# Patient Record
Sex: FEMALE | Race: WHITE | NOT HISPANIC OR LATINO | URBAN - METROPOLITAN AREA
[De-identification: names, ages, dates, MRNs, and addresses within clinical notes are randomized per-mention and may not be internally consistent; named-entity substitution may affect disease eponyms.]

---

## 2024-03-08 ENCOUNTER — EMERGENCY (EMERGENCY)
Facility: HOSPITAL | Age: 34
LOS: 1 days | Discharge: HOME | End: 2024-03-08
Attending: STUDENT IN AN ORGANIZED HEALTH CARE EDUCATION/TRAINING PROGRAM
Payer: COMMERCIAL

## 2024-03-08 VITALS
DIASTOLIC BLOOD PRESSURE: 81 MMHG | HEART RATE: 68 BPM | OXYGEN SATURATION: 99 % | HEIGHT: 67 IN | TEMPERATURE: 98 F | WEIGHT: 199.96 LBS | RESPIRATION RATE: 18 BRPM | SYSTOLIC BLOOD PRESSURE: 131 MMHG

## 2024-03-08 LAB
ALBUMIN SERPL ELPH-MCNC: 3.6 G/DL — SIGNIFICANT CHANGE UP (ref 3.5–5)
ALP SERPL-CCNC: 54 U/L — SIGNIFICANT CHANGE UP (ref 40–120)
ALT FLD-CCNC: 21 U/L DA — SIGNIFICANT CHANGE UP (ref 10–60)
ANION GAP SERPL CALC-SCNC: 8 MMOL/L — SIGNIFICANT CHANGE UP (ref 5–17)
APAP SERPL-MCNC: <2 UG/ML — LOW (ref 10–30)
AST SERPL-CCNC: 18 U/L — SIGNIFICANT CHANGE UP (ref 10–40)
BASE EXCESS BLDV CALC-SCNC: 3.5 MMOL/L — SIGNIFICANT CHANGE UP
BASOPHILS # BLD AUTO: 0 K/UL — SIGNIFICANT CHANGE UP (ref 0–0.2)
BASOPHILS NFR BLD AUTO: 0 % — SIGNIFICANT CHANGE UP (ref 0–2)
BILIRUB SERPL-MCNC: 0.2 MG/DL — SIGNIFICANT CHANGE UP (ref 0.2–1.2)
BLOOD GAS COMMENTS, VENOUS: SIGNIFICANT CHANGE UP
BUN SERPL-MCNC: 18 MG/DL — SIGNIFICANT CHANGE UP (ref 7–18)
CA-I SERPL-SCNC: SIGNIFICANT CHANGE UP MMOL/L (ref 1.15–1.33)
CALCIUM SERPL-MCNC: 9.6 MG/DL — SIGNIFICANT CHANGE UP (ref 8.4–10.5)
CHLORIDE SERPL-SCNC: 108 MMOL/L — SIGNIFICANT CHANGE UP (ref 96–108)
CK SERPL-CCNC: 65 U/L — SIGNIFICANT CHANGE UP (ref 21–215)
CO2 SERPL-SCNC: 24 MMOL/L — SIGNIFICANT CHANGE UP (ref 22–31)
CREAT SERPL-MCNC: 0.78 MG/DL — SIGNIFICANT CHANGE UP (ref 0.5–1.3)
EGFR: 103 ML/MIN/1.73M2 — SIGNIFICANT CHANGE UP
EOSINOPHIL # BLD AUTO: 0.61 K/UL — HIGH (ref 0–0.5)
EOSINOPHIL NFR BLD AUTO: 5 % — SIGNIFICANT CHANGE UP (ref 0–6)
ETHANOL SERPL-MCNC: <3 MG/DL — SIGNIFICANT CHANGE UP (ref 0–10)
GAS PNL BLDV: 137 MMOL/L — SIGNIFICANT CHANGE UP (ref 136–145)
GAS PNL BLDV: SIGNIFICANT CHANGE UP
GLUCOSE SERPL-MCNC: 94 MG/DL — SIGNIFICANT CHANGE UP (ref 70–99)
HCG SERPL-ACNC: <1 MIU/ML — SIGNIFICANT CHANGE UP
HCO3 BLDV-SCNC: 29 MMOL/L — SIGNIFICANT CHANGE UP (ref 22–29)
HCT VFR BLD CALC: 37.6 % — SIGNIFICANT CHANGE UP (ref 34.5–45)
HGB BLD-MCNC: 12.4 G/DL — SIGNIFICANT CHANGE UP (ref 11.5–15.5)
HOROWITZ INDEX BLDV+IHG-RTO: 21 — SIGNIFICANT CHANGE UP
LACTATE BLDV-MCNC: 0.9 MMOL/L — SIGNIFICANT CHANGE UP (ref 0.5–2)
LYMPHOCYTES # BLD AUTO: 48 % — HIGH (ref 13–44)
LYMPHOCYTES # BLD AUTO: 5.83 K/UL — HIGH (ref 1–3.3)
MAGNESIUM SERPL-MCNC: 2 MG/DL — SIGNIFICANT CHANGE UP (ref 1.6–2.6)
MANUAL SMEAR VERIFICATION: SIGNIFICANT CHANGE UP
MCHC RBC-ENTMCNC: 29.2 PG — SIGNIFICANT CHANGE UP (ref 27–34)
MCHC RBC-ENTMCNC: 33 GM/DL — SIGNIFICANT CHANGE UP (ref 32–36)
MCV RBC AUTO: 88.5 FL — SIGNIFICANT CHANGE UP (ref 80–100)
MONOCYTES # BLD AUTO: 1.09 K/UL — HIGH (ref 0–0.9)
MONOCYTES NFR BLD AUTO: 9 % — SIGNIFICANT CHANGE UP (ref 2–14)
NEUTROPHILS # BLD AUTO: 4.62 K/UL — SIGNIFICANT CHANGE UP (ref 1.8–7.4)
NEUTROPHILS NFR BLD AUTO: 38 % — LOW (ref 43–77)
NRBC # BLD: 0 /100 WBCS — SIGNIFICANT CHANGE UP (ref 0–0)
PCO2 BLDV: 47 MMHG — HIGH (ref 39–42)
PH BLDV: 7.4 — SIGNIFICANT CHANGE UP (ref 7.32–7.43)
PHOSPHATE SERPL-MCNC: 3.9 MG/DL — SIGNIFICANT CHANGE UP (ref 2.5–4.5)
PLAT MORPH BLD: NORMAL — SIGNIFICANT CHANGE UP
PLATELET # BLD AUTO: 211 K/UL — SIGNIFICANT CHANGE UP (ref 150–400)
PO2 BLDV: 25 MMHG — SIGNIFICANT CHANGE UP
POTASSIUM BLDV-SCNC: 4.8 MMOL/L — SIGNIFICANT CHANGE UP (ref 3.5–5.1)
POTASSIUM SERPL-MCNC: 4.3 MMOL/L — SIGNIFICANT CHANGE UP (ref 3.5–5.3)
POTASSIUM SERPL-SCNC: 4.3 MMOL/L — SIGNIFICANT CHANGE UP (ref 3.5–5.3)
PROT SERPL-MCNC: 6.9 G/DL — SIGNIFICANT CHANGE UP (ref 6–8.3)
RBC # BLD: 4.25 M/UL — SIGNIFICANT CHANGE UP (ref 3.8–5.2)
RBC # FLD: 12.7 % — SIGNIFICANT CHANGE UP (ref 10.3–14.5)
RBC BLD AUTO: NORMAL — SIGNIFICANT CHANGE UP
SAO2 % BLDV: 32.5 % — SIGNIFICANT CHANGE UP
SODIUM SERPL-SCNC: 140 MMOL/L — SIGNIFICANT CHANGE UP (ref 135–145)
TSH SERPL-MCNC: 2.47 UU/ML — SIGNIFICANT CHANGE UP (ref 0.34–4.82)
WBC # BLD: 12.15 K/UL — HIGH (ref 3.8–10.5)
WBC # FLD AUTO: 12.15 K/UL — HIGH (ref 3.8–10.5)

## 2024-03-08 PROCEDURE — 99285 EMERGENCY DEPT VISIT HI MDM: CPT | Mod: 25

## 2024-03-08 PROCEDURE — 80053 COMPREHEN METABOLIC PANEL: CPT

## 2024-03-08 PROCEDURE — 93010 ELECTROCARDIOGRAM REPORT: CPT

## 2024-03-08 PROCEDURE — 80307 DRUG TEST PRSMV CHEM ANLYZR: CPT

## 2024-03-08 PROCEDURE — 84100 ASSAY OF PHOSPHORUS: CPT

## 2024-03-08 PROCEDURE — 82803 BLOOD GASES ANY COMBINATION: CPT

## 2024-03-08 PROCEDURE — 84295 ASSAY OF SERUM SODIUM: CPT

## 2024-03-08 PROCEDURE — 85025 COMPLETE CBC W/AUTO DIFF WBC: CPT

## 2024-03-08 PROCEDURE — 82330 ASSAY OF CALCIUM: CPT

## 2024-03-08 PROCEDURE — 84443 ASSAY THYROID STIM HORMONE: CPT

## 2024-03-08 PROCEDURE — 83735 ASSAY OF MAGNESIUM: CPT

## 2024-03-08 PROCEDURE — 93005 ELECTROCARDIOGRAM TRACING: CPT

## 2024-03-08 PROCEDURE — 99285 EMERGENCY DEPT VISIT HI MDM: CPT

## 2024-03-08 PROCEDURE — 82550 ASSAY OF CK (CPK): CPT

## 2024-03-08 PROCEDURE — 84702 CHORIONIC GONADOTROPIN TEST: CPT

## 2024-03-08 PROCEDURE — 36415 COLL VENOUS BLD VENIPUNCTURE: CPT

## 2024-03-08 PROCEDURE — 83605 ASSAY OF LACTIC ACID: CPT

## 2024-03-08 PROCEDURE — 84132 ASSAY OF SERUM POTASSIUM: CPT

## 2024-03-08 PROCEDURE — 71045 X-RAY EXAM CHEST 1 VIEW: CPT

## 2024-03-08 RX ORDER — SODIUM CHLORIDE 9 MG/ML
1000 INJECTION, SOLUTION INTRAVENOUS ONCE
Refills: 0 | Status: COMPLETED | OUTPATIENT
Start: 2024-03-08 | End: 2024-03-08

## 2024-03-08 RX ORDER — DIAZEPAM 5 MG
2 TABLET ORAL ONCE
Refills: 0 | Status: DISCONTINUED | OUTPATIENT
Start: 2024-03-08 | End: 2024-03-08

## 2024-03-08 RX ADMIN — SODIUM CHLORIDE 1000 MILLILITER(S): 9 INJECTION, SOLUTION INTRAVENOUS at 22:47

## 2024-03-08 RX ADMIN — Medication 2 MILLIGRAM(S): at 22:47

## 2024-03-08 NOTE — ED ADULT TRIAGE NOTE - CHIEF COMPLAINT QUOTE
Pt stated she was having chest pain before she started taking unprescribed ritalin after taking ritalin the chest pain went away c/o numbness and tingling to both arms, denies chest pain at this time was sen at urgent care today for same c/o and referred to come to the ER.

## 2024-03-08 NOTE — ED PROVIDER NOTE - PATIENT PORTAL LINK FT
You can access the FollowMyHealth Patient Portal offered by [facility] by registering at the following website: [email or web address]. By joining [employer]’s FollowMyHealth portal, you will also be able to view your health information using other applications (apps) compatible with our system. Yes

## 2024-03-08 NOTE — ED PROVIDER NOTE - CLINICAL SUMMARY MEDICAL DECISION MAKING FREE TEXT BOX
33-year-old female with no significant past medical history presents to the emergency department with bilateral distal upper extremity paresthesias for the past 3 to 4 days as well as bilateral distal lower extremity paresthesias for the past 3 to 4 days, patient also reports some palpitations and an episode of chest pain that resolved 3 days ago.  Patient states that for the past almost a week she has been taking an unprescribed Ritalin to help her focus, she states that she looked up online that if you have these symptoms after taking Ritalin patient's seek medical attention.  Patient went to urgent care and had an EKG done that they were concerned about, unclear what they are concerned about this time looking at the EKG, and sent him to the emergency department.  Patient also reports taking the [employer] energy drink within the past 3 to 4 days.  Denies any illicit drugs, denies daily alcohol drinking.  Patient denies any fevers or chills, URI symptoms, cough, shortness of breath, lightheadedness, abdominal pain, vomiting or diarrhea, dark or bloody stools, urinary symptoms, abnormal uterine bleeding.      ALL SYSTEMS REVIEWED AND NEGATIVE EXCEPT AS ABOVE IN THE HISTORY     GENERAL: no acute distress, non-toxic appearing  HEENT: normal conjunctiva, oral mucosa moist  CARDIAC: regular rate and regular rhythm, normal S1 and S2, no appreciable murmurs, 2+ distal pulses, good cap refill to toes/fingers  PULM: clear to ascultation bilaterally, no appreciable crackles, rales, rhonchi, or wheezing, sats 100% on RA, no increased work of breathing  GI: abdomen nondistended, soft, nontender  : no CVA tenderness, no suprapubic tenderness  NEURO: alert and oriented x 3, normal speech, moving all extremities without lateralization  MSK: no visible deformities, no peripheral edema, calf tenderness/redness/swelling, no tense compartments  SKIN: no visible rashes  PSYCH: appropriate mood and affect     MDM: Most likely side effect of Ritalin versus caffeine/methylxanthine's versus hyperventilation syndrome.  Low suspicion for primary cardiac, or metabolic cause of patient's symptoms.  Plan for labs, TSH, EKG, lites, hCG, CK, diazepam, fluids, chest x-ray.  Reassess. 33-year-old female with no significant past medical history presents to the emergency department with bilateral distal upper extremity paresthesias for the past 3 to 4 days as well as bilateral distal lower extremity paresthesias for the past 3 to 4 days, patient also reports some palpitations and an episode of chest pain that resolved 3 days ago.  Patient states that for the past almost a week she has been taking an unprescribed Ritalin to help her focus, she states that she looked up online that if you have these symptoms after taking Ritalin patient's seek medical attention.  Patient went to urgent care and had an EKG done that they were concerned about, unclear what they are concerned about this time looking at the EKG, and sent him to the emergency department.  Patient also reports taking the [employer] energy drink within the past 3 to 4 days.  Denies any illicit drugs, denies daily alcohol drinking.  Patient denies any fevers or chills, URI symptoms, cough, shortness of breath, lightheadedness, abdominal pain, vomiting or diarrhea, dark or bloody stools, urinary symptoms, abnormal uterine bleeding.      ALL SYSTEMS REVIEWED AND NEGATIVE EXCEPT AS ABOVE IN THE HISTORY     GENERAL: no acute distress, non-toxic appearing  HEENT: normal conjunctiva, oral mucosa moist  CARDIAC: regular rate and regular rhythm, normal S1 and S2, no appreciable murmurs, 2+ distal pulses, good cap refill to toes/fingers  PULM: clear to ascultation bilaterally, no appreciable crackles, rales, rhonchi, or wheezing, sats 100% on RA, no increased work of breathing  GI: abdomen nondistended, soft, nontender  : no CVA tenderness, no suprapubic tenderness  NEURO: alert and oriented x 3, normal speech, moving all extremities without lateralization  MSK: no visible deformities, no peripheral edema, calf tenderness/redness/swelling, no tense compartments  SKIN: no visible rashes  PSYCH: appropriate mood and affect     MDM: Most likely side effect of Ritalin versus caffeine/methylxanthine's versus hyperventilation syndrome.  Low suspicion for primary cardiac, or metabolic cause of patient's symptoms.  Plan for labs, TSH, EKG, lites, hCG, CK, diazepam, fluids, chest x-ray.  Reassess.    ===================================  update (Reinaldo Paul DO; emergency medicine physician) <time> Patient's labs reviewed and are all reassuring, patient's chest x-ray reviewed without emergent findings though unable to put in wet read at this time, patient's EKG is with normal intervals. Explained to patient that this is likely side effect of the Ritalin she has been taking and to stop taking the Ritalin and see how her symptoms progress.  Low concern for any clinically significant toxicity. Pt expresses verbal understanding. Safe for discharge.

## 2024-03-08 NOTE — ED ADULT NURSE NOTE - OBJECTIVE STATEMENT
As pe rpt. c/o chest pain with numbness and tingling to bilateral upper extremity earlier, went to the UC and was referred here for further evaluation. Denies pain right now and all other symptoms.

## 2024-03-08 NOTE — ED PROVIDER NOTE - NSFOLLOWUPINSTRUCTIONS_ED_ALL_ED_FT
- Please follow up with your Primary Care Doctor within 1 week. Bring your results from today.    - Stop taking ritalin and see how your symptoms improve after removal from exposure.     - Be sure to return to the ED if you develop new, worsening, or any distressing symptoms.

## 2024-03-09 VITALS
HEART RATE: 62 BPM | DIASTOLIC BLOOD PRESSURE: 72 MMHG | SYSTOLIC BLOOD PRESSURE: 122 MMHG | OXYGEN SATURATION: 99 % | TEMPERATURE: 98 F | RESPIRATION RATE: 18 BRPM

## 2024-03-09 PROCEDURE — 71045 X-RAY EXAM CHEST 1 VIEW: CPT | Mod: 26

## 2024-03-11 LAB — CA-I BLD-SCNC: 5.3 MG/DL — SIGNIFICANT CHANGE UP (ref 4.5–5.6)

## 2025-05-28 NOTE — ED ADULT NURSE NOTE - NSFALLUNIVINTERV_ED_ALL_ED
Last Refill: atorvastatin 4/2/25  Metformin 3/4/25    Last OV: 10/4/24    Next OV:  6/17/25     Bed/Stretcher in lowest position, wheels locked, appropriate side rails in place/Call bell, personal items and telephone in reach/Instruct patient to call for assistance before getting out of bed/chair/stretcher/Non-slip footwear applied when patient is off stretcher/[place] to call system/Physically safe environment - no spills, clutter or unnecessary equipment/Purposeful proactive rounding/Room/bathroom lighting operational, light cord in reach